# Patient Record
Sex: FEMALE | Race: WHITE | NOT HISPANIC OR LATINO | Employment: OTHER | URBAN - METROPOLITAN AREA
[De-identification: names, ages, dates, MRNs, and addresses within clinical notes are randomized per-mention and may not be internally consistent; named-entity substitution may affect disease eponyms.]

---

## 2020-03-17 ENCOUNTER — CONSULT (OUTPATIENT)
Dept: GASTROENTEROLOGY | Facility: CLINIC | Age: 65
End: 2020-03-17
Payer: MEDICARE

## 2020-03-17 VITALS
HEART RATE: 67 BPM | SYSTOLIC BLOOD PRESSURE: 118 MMHG | WEIGHT: 222 LBS | BODY MASS INDEX: 33.65 KG/M2 | HEIGHT: 68 IN | DIASTOLIC BLOOD PRESSURE: 84 MMHG

## 2020-03-17 DIAGNOSIS — Z12.11 SCREENING FOR COLON CANCER: ICD-10-CM

## 2020-03-17 DIAGNOSIS — R19.7 DIARRHEA, UNSPECIFIED TYPE: Primary | ICD-10-CM

## 2020-03-17 PROCEDURE — 99204 OFFICE O/P NEW MOD 45 MIN: CPT | Performed by: INTERNAL MEDICINE

## 2020-03-17 NOTE — PROGRESS NOTES
[address] [facility] - Outpatient Consultation  Richard Encinas 76 y o  female MRN: [id number]  Encounter: [id number]    ASSESSMENT AND PLAN:      1  Diarrhea, unspecified type  Chronic intermittent diarrhea a sounds mostly like diarrhea predominant irritable bowel  There are no constitutional symptoms or signs of bleeding  However the new onset of these symptoms over the past few years as opposed to more lifelong problem goes against irritable bowel syndrome  Dietary intolerance is due to specific enzyme deficiencies, pancreatic insufficiency, celiac disease should all be considered  Post cholecystectomy diarrhea this possible but 1 would think that symptoms would have occurred sooner since her cholecystectomy was 11 years ago  This is unlikely to be infectious but chronic infection such as Giardia should be excluded  Differential diagnosis also includes thyroid disease and small intestinal bacterial overgrowth syndrome  Inflammatory bowel disease such as ulcerative colitis and Crohn's disease are less likely  Microscopic colitis (collagenous colitis or lymphocytic colitis) can present this way however  Recommend lab screening including blood count, chemistries, TSH, celiac antibodies along with C-reactive protein  Stool studies to assess for infection, inflammation and malabsorption  In the meantime recommend continue dietary monitoring, trial of fiber and consider probiotic  · Monitor diet for specific trigger foods  · Fiber supplement such as Metamucil or psyllium husk 2 spoons daily  · Consider probiotic such as Align, Culturelle or VSL 3  - CBC; Future  - Celiac Disease Antibody Profile; Future  - C-reactive protein; Future  - Comprehensive metabolic panel; Future  - TSH, 3rd generation with Free T4 reflex; Future  - Calprotectin,Fecal; Future  - Clostridium difficile toxin by PCR with EIA; Future  - Giardia antigen; Future  - Ova and parasite examination;  Future  - MISCELLANEOUS LAB TEST; Future  - Fecal fat, qualitative; Future    2  Screening for colon cancer  Average risk  Had negative Cologuard test last year  Recommend repeat colon guard in 3 years unless alternative testing as needed  Followup Appointment:  6-8 weeks  ______________________________________________________________________    Chief Complaint   Patient presents with    Diarrhea     ongoing for years; referred by [facility] Jeannette Mejias       HPI:   Gerhardt Cisco is a 76y o  year old female who presents for evaluation of diarrhea  Long history of IBS type symptoms  Managing, but getting annoying  Has urgency and "instant" diarrhea  Feels well, then has urgent watery stools, followed by more stools  Affecting life  Has episodes three times a week  Did Cologuard last September, negative  No pain or cramping  Possibly related to nuts, oats, [place] food, iceberg lettuce, beef  Eating these things trigger urgent stools shortly after the meal   Tried changing to lactose free milk  No nausea or vomiting  No UGI symptoms  No change in appetite or weight, trying to loose weight  Had cholecystectomy in 2009, no issues with diarrhea during the few years after  No significant travel  No medications, just OTC Ca++ and vitaminD  Occasional iron when donates blood  No fiber therapy or probiotics  Uses imodium as needed if going out  No bleeding, no melena  No floating or oily stools  No antibiotic therapy  Historical Information   History reviewed  No pertinent past medical history    Past Surgical History:   Procedure Laterality Date    CHOLECYSTECTOMY       Social History     Substance and Sexual Activity   Alcohol Use Never    Frequency: Never     Social History     Substance and Sexual Activity   Drug Use Never     Social History     Tobacco Use   Smoking Status Former Smoker   Smokeless Tobacco Never Used     Family History   Problem Relation Age of Onset    Alzheimer's disease Father  Alzheimer's disease Brother     Colon cancer Neg Hx     Colon polyps Neg Hx     Inflammatory bowel disease Neg Hx     Celiac disease Neg Hx        Meds/Allergies   No current outpatient medications on file  Allergies   Allergen Reactions    Penicillins        PHYSICAL EXAM:    Blood pressure 118/84, pulse 67, height 5' 8" (1 727 m), weight 101 kg (222 lb)  Body mass index is 33 75 kg/m²  General Appearance: NAD, cooperative, alert  Eyes: Anicteric, PERRLA, EOMI  ENT:  Normocephalic, atraumatic, normal mucosa  Neck:  Supple, symmetrical, trachea midline,   Resp:  Clear to auscultation bilaterally; no rales, rhonchi or wheezing; respirations unlabored   CV:  S1 S2, Regular rate and rhythm; no murmur, rub, or gallop  GI:  Soft, non-tender, non-distended; normal bowel sounds; no masses, no organomegaly   Rectal: Deferred  Musculoskeletal: No cyanosis, clubbing or edema  Normal ROM  Skin:  No jaundice, rashes, or lesions   Heme/Lymph: No palpable cervical lymphadenopathy  Psych: Normal affect, good eye contact  Neuro: No gross deficits, AAOx3    Lab Results:   No results found for: WBC, HGB, HCT, MCV, PLT  No results found for: NA, K, CL, CO2, ANIONGAP, BUN, CREATININE, GLUCOSE, GLUF, CALCIUM, CORRECTEDCA, AST, ALT, ALKPHOS, PROT, BILITOT, EGFR  No results found for: IRON, TIBC, FERRITIN  No results found for: LIPASE    Radiology Results:   No results found  REVIEW OF SYSTEMS:    CONSTITUTIONAL: Denies any fever, chills, rigors, and weight loss  HEENT: No earache or tinnitus  Denies hearing loss or visual disturbances  CARDIOVASCULAR: No chest pain or palpitations  RESPIRATORY: Denies any cough, hemoptysis, shortness of breath or dyspnea on exertion  GASTROINTESTINAL: As noted in the History of Present Illness  GENITOURINARY: No problems with urination  Denies any hematuria or dysuria  NEUROLOGIC: No dizziness or vertigo, denies headaches  MUSCULOSKELETAL: Denies any muscle or joint pain  SKIN: Denies skin rashes but positive for itching  ENDOCRINE: Denies excessive thirst  Denies intolerance to heat or cold  PSYCHOSOCIAL: Denies depression or anxiety  Denies any recent memory loss

## 2020-03-17 NOTE — PATIENT INSTRUCTIONS
Continue to observe for any dietary triggers  Trial of fiber supplement such as Metamucil or psyllium husk, 2 spoons daily, titrate as needed  Can also try probiotic such as Align, Culturelle, or VSL-3  Complete stool studies and lab work

## 2020-03-17 NOTE — LETTER
March 17, 2020     PAULA Maldonado  [address] [facility] [address]    Patient: Nicholas Maravilla   YOB: 1955   Date of Visit: 3/17/2020       Dear Dr Laura Alcantara:    Thank you for referring [address] to me for evaluation  Below are my notes for this consultation  If you have questions, please do not hesitate to call me  I look forward to following your patient along with you  Sincerely,        Lucia Canales MD        CC: No Recipients  Lucia Canales MD  3/17/2020  5:09 PM  Sign at close encounter    [address] [facility] - Outpatient Consultation  Nicholas Maravilla 76 y o  female MRN: [id number]  Encounter: [id number]    ASSESSMENT AND PLAN:      1  Diarrhea, unspecified type  Chronic intermittent diarrhea a sounds mostly like diarrhea predominant irritable bowel  There are no constitutional symptoms or signs of bleeding  However the new onset of these symptoms over the past few years as opposed to more lifelong problem goes against irritable bowel syndrome  Dietary intolerance is due to specific enzyme deficiencies, pancreatic insufficiency, celiac disease should all be considered  Post cholecystectomy diarrhea this possible but 1 would think that symptoms would have occurred sooner since her cholecystectomy was 11 years ago  This is unlikely to be infectious but chronic infection such as Giardia should be excluded  Differential diagnosis also includes thyroid disease and small intestinal bacterial overgrowth syndrome  Inflammatory bowel disease such as ulcerative colitis and Crohn's disease are less likely  Microscopic colitis (collagenous colitis or lymphocytic colitis) can present this way however  Recommend lab screening including blood count, chemistries, TSH, celiac antibodies along with C-reactive protein  Stool studies to assess for infection, inflammation and malabsorption    In the meantime recommend continue dietary monitoring, trial of fiber and consider probiotic  · Monitor diet for specific trigger foods  · Fiber supplement such as Metamucil or psyllium husk 2 spoons daily  · Consider probiotic such as Align, Culturelle or VSL 3  - CBC; Future  - Celiac Disease Antibody Profile; Future  - C-reactive protein; Future  - Comprehensive metabolic panel; Future  - TSH, 3rd generation with Free T4 reflex; Future  - Calprotectin,Fecal; Future  - Clostridium difficile toxin by PCR with EIA; Future  - Giardia antigen; Future  - Ova and parasite examination; Future  - MISCELLANEOUS LAB TEST; Future  - Fecal fat, qualitative; Future    2  Screening for colon cancer  Average risk  Had negative Cologuard test last year  Recommend repeat colon guard in 3 years unless alternative testing as needed  Followup Appointment:  6-8 weeks  ______________________________________________________________________    Chief Complaint   Patient presents with    Diarrhea     ongoing for years; [facility] by [facility] Callie Conti       HPI:   Sukhi Zuñiga is a 76y o  year old female who presents for evaluation of diarrhea  Long history of IBS type symptoms  Managing, but getting annoying  Has urgency and "instant" diarrhea  Feels well, then has urgent watery stools, followed by more stools  Affecting life  Has episodes three times a week  Did Cologuard last September, negative  No pain or cramping  Possibly related to nuts, oats, [place] food, iceberg lettuce, beef  Eating these things trigger urgent stools shortly after the meal   Tried changing to lactose free milk  No nausea or vomiting  No UGI symptoms  No change in appetite or weight, trying to loose weight  Had cholecystectomy in 2009, no issues with diarrhea during the few years after  No significant travel  No medications, just OTC Ca++ and vitaminD  Occasional iron when donates blood  No fiber therapy or probiotics  Uses imodium as needed if going out  No bleeding, no melena    No floating or oily stools  No antibiotic therapy  Historical Information   History reviewed  No pertinent past medical history  Past Surgical History:   Procedure Laterality Date    CHOLECYSTECTOMY       Social History     Substance and Sexual Activity   Alcohol Use Never    Frequency: Never     Social History     Substance and Sexual Activity   Drug Use Never     Social History     Tobacco Use   Smoking Status Former Smoker   Smokeless Tobacco Never Used     Family History   Problem Relation Age of Onset    Alzheimer's disease Father     Alzheimer's disease Brother     Colon cancer Neg Hx     Colon polyps Neg Hx     Inflammatory bowel disease Neg Hx     Celiac disease Neg Hx        Meds/Allergies   No current outpatient medications on file  Allergies   Allergen Reactions    Penicillins        PHYSICAL EXAM:    Blood pressure 118/84, pulse 67, height 5' 8" (1 727 m), weight 101 kg (222 lb)  Body mass index is 33 75 kg/m²  General Appearance: NAD, cooperative, alert  Eyes: Anicteric, PERRLA, EOMI  ENT:  Normocephalic, atraumatic, normal mucosa  Neck:  Supple, symmetrical, trachea midline,   Resp:  Clear to auscultation bilaterally; no rales, rhonchi or wheezing; respirations unlabored   CV:  S1 S2, Regular rate and rhythm; no murmur, rub, or gallop  GI:  Soft, non-tender, non-distended; normal bowel sounds; no masses, no organomegaly   Rectal: Deferred  Musculoskeletal: No cyanosis, clubbing or edema  Normal ROM  Skin:  No jaundice, rashes, or lesions   Heme/Lymph: No palpable cervical lymphadenopathy  Psych: Normal affect, good eye contact  Neuro: No gross deficits, AAOx3    Lab Results:   No results found for: WBC, HGB, HCT, MCV, PLT  No results found for: NA, K, CL, CO2, ANIONGAP, BUN, CREATININE, GLUCOSE, GLUF, CALCIUM, CORRECTEDCA, AST, ALT, ALKPHOS, PROT, BILITOT, EGFR  No results found for: IRON, TIBC, FERRITIN  No results found for: LIPASE    Radiology Results:   No results found      REVIEW OF SYSTEMS:    CONSTITUTIONAL: Denies any fever, chills, rigors, and weight loss  HEENT: No earache or tinnitus  Denies hearing loss or visual disturbances  CARDIOVASCULAR: No chest pain or palpitations  RESPIRATORY: Denies any cough, hemoptysis, shortness of breath or dyspnea on exertion  GASTROINTESTINAL: As noted in the History of Present Illness  GENITOURINARY: No problems with urination  Denies any hematuria or dysuria  NEUROLOGIC: No dizziness or vertigo, denies headaches  MUSCULOSKELETAL: Denies any muscle or joint pain  SKIN: Denies skin rashes but positive for itching  ENDOCRINE: Denies excessive thirst  Denies intolerance to heat or cold  PSYCHOSOCIAL: Denies depression or anxiety  Denies any recent memory loss